# Patient Record
Sex: FEMALE
[De-identification: names, ages, dates, MRNs, and addresses within clinical notes are randomized per-mention and may not be internally consistent; named-entity substitution may affect disease eponyms.]

---

## 2017-05-25 ENCOUNTER — TRANSCRIPTION ENCOUNTER (OUTPATIENT)
Age: 27
End: 2017-05-25

## 2017-05-28 ENCOUNTER — EMERGENCY (EMERGENCY)
Facility: HOSPITAL | Age: 27
LOS: 1 days | Discharge: HOME | End: 2017-05-28
Attending: EMERGENCY MEDICINE | Admitting: EMERGENCY MEDICINE
Payer: COMMERCIAL

## 2017-05-28 VITALS
OXYGEN SATURATION: 100 % | WEIGHT: 125 LBS | TEMPERATURE: 98 F | RESPIRATION RATE: 16 BRPM | SYSTOLIC BLOOD PRESSURE: 125 MMHG | DIASTOLIC BLOOD PRESSURE: 81 MMHG | HEART RATE: 68 BPM

## 2017-05-28 VITALS
TEMPERATURE: 98 F | DIASTOLIC BLOOD PRESSURE: 89 MMHG | RESPIRATION RATE: 15 BRPM | SYSTOLIC BLOOD PRESSURE: 117 MMHG | HEART RATE: 78 BPM | OXYGEN SATURATION: 98 %

## 2017-05-28 DIAGNOSIS — L03.011 CELLULITIS OF RIGHT FINGER: ICD-10-CM

## 2017-05-28 DIAGNOSIS — Z91.018 ALLERGY TO OTHER FOODS: ICD-10-CM

## 2017-05-28 DIAGNOSIS — Z91.011 ALLERGY TO MILK PRODUCTS: ICD-10-CM

## 2017-05-28 PROCEDURE — 99283 EMERGENCY DEPT VISIT LOW MDM: CPT

## 2017-05-28 PROCEDURE — 73130 X-RAY EXAM OF HAND: CPT

## 2017-05-28 PROCEDURE — 73130 X-RAY EXAM OF HAND: CPT | Mod: 26,RT

## 2017-05-28 PROCEDURE — 99284 EMERGENCY DEPT VISIT MOD MDM: CPT

## 2017-05-28 RX ORDER — IBUPROFEN 200 MG
1 TABLET ORAL
Qty: 20 | Refills: 0 | OUTPATIENT
Start: 2017-05-28 | End: 2017-06-02

## 2017-05-28 RX ORDER — AZTREONAM 2 G
1 VIAL (EA) INJECTION
Qty: 14 | Refills: 0 | OUTPATIENT
Start: 2017-05-28 | End: 2017-06-04

## 2017-05-28 RX ADMIN — Medication 1 TABLET(S): at 14:15

## 2017-05-28 NOTE — ED PROVIDER NOTE - CHPI ED SYMPTOMS NEG
no bleeding/no purulent drainage/no red streaks/no vomiting/no chills/no drainage/no bleeding at site/no fever

## 2017-05-28 NOTE — ED PROVIDER NOTE - MEDICAL DECISION MAKING DETAILS
hand consult, abx, tetanus UTD, xray hand consult, abx, tetanus UTD as per pt, xray  Please follow up with your Primary MD in 24-48 hr for wound check. Please follow up with Hand Dr Quiros within 3 days- call tomorrow morning for an appointment. Wound care as per Dr Quiros. Bactrim twice a day x 7 days. Ibuprofen every 6 hours as needed for pain, take with food. Percocet every 6 hours as needed for pain, do not drive or drink alcohol while taking this medication. Watch for local signs of infection such as increased redness, warmth, swelling, pain, discharge. Return to ED immediately if condition worsens or if development of nausea, vomiting, fever, chills or diarrhea.  Seek immediate medical care for any new/worsening signs or symptoms.

## 2017-05-28 NOTE — ED PROVIDER NOTE - ATTENDING CONTRIBUTION TO CARE
Right second finger pain with swelling x few days despite AbXs at [facility] [place]. Exam revealed swelling tenderness to palpation pulp of right 2nd digit. I agree with plan and management outlined by PA

## 2017-05-28 NOTE — ED PROVIDER NOTE - OBJECTIVE STATEMENT
28 y/o female presents to ED c/o right 2nd distal digit swelling and pain x 1 week. 28 y/o female presents to ED c/o right 2nd distal digit swelling and pain x 1 week. States she went to an urgent care on thursday who put her on cefadroxil 1 gram once daily. Pt states there has not been any improvement so that's why she came to the ER today. Rates pain 5/10, worse with palpation, gradual onset, no radiation of pain. Denies recent fall or trauma. Denies any other complaints. States she otherwise feels good. Denies n/v, f/c, chest pain, sob, numbness, tingling, recent traveling.

## 2017-05-28 NOTE — ED PROVIDER NOTE - PHYSICAL EXAMINATION
right hand- No bony tenderness. NVI, good distal pulses BL, capillary refill <2 sec BL, sensation intact throughout, 5/5 motor x 4 extremities. +1cm x 1cm area of localized tenderness, +mild swelling and +mild redness to right 2nd distal digit. No streaking, no warmth, no bruising.

## 2017-06-06 ENCOUNTER — TRANSCRIPTION ENCOUNTER (OUTPATIENT)
Age: 27
End: 2017-06-06

## 2021-08-20 ENCOUNTER — TRANSCRIPTION ENCOUNTER (OUTPATIENT)
Age: 31
End: 2021-08-20

## 2021-09-04 ENCOUNTER — TRANSCRIPTION ENCOUNTER (OUTPATIENT)
Age: 31
End: 2021-09-04